# Patient Record
Sex: FEMALE | Race: WHITE
[De-identification: names, ages, dates, MRNs, and addresses within clinical notes are randomized per-mention and may not be internally consistent; named-entity substitution may affect disease eponyms.]

---

## 2018-01-12 ENCOUNTER — HOSPITAL ENCOUNTER (EMERGENCY)
Dept: HOSPITAL 62 - ER | Age: 23
LOS: 1 days | Discharge: HOME | End: 2018-01-13
Payer: COMMERCIAL

## 2018-01-12 VITALS — DIASTOLIC BLOOD PRESSURE: 77 MMHG | SYSTOLIC BLOOD PRESSURE: 121 MMHG

## 2018-01-12 DIAGNOSIS — R10.30: ICD-10-CM

## 2018-01-12 DIAGNOSIS — N20.0: Primary | ICD-10-CM

## 2018-01-12 DIAGNOSIS — R11.2: ICD-10-CM

## 2018-01-12 PROCEDURE — 76380 CAT SCAN FOLLOW-UP STUDY: CPT

## 2018-01-12 PROCEDURE — 36415 COLL VENOUS BLD VENIPUNCTURE: CPT

## 2018-01-12 PROCEDURE — 96375 TX/PRO/DX INJ NEW DRUG ADDON: CPT

## 2018-01-12 PROCEDURE — 85025 COMPLETE CBC W/AUTO DIFF WBC: CPT

## 2018-01-12 PROCEDURE — 83690 ASSAY OF LIPASE: CPT

## 2018-01-12 PROCEDURE — 81025 URINE PREGNANCY TEST: CPT

## 2018-01-12 PROCEDURE — 96374 THER/PROPH/DIAG INJ IV PUSH: CPT

## 2018-01-12 PROCEDURE — 99284 EMERGENCY DEPT VISIT MOD MDM: CPT

## 2018-01-12 PROCEDURE — 81001 URINALYSIS AUTO W/SCOPE: CPT

## 2018-01-12 PROCEDURE — 80053 COMPREHEN METABOLIC PANEL: CPT

## 2018-01-12 PROCEDURE — 87086 URINE CULTURE/COLONY COUNT: CPT

## 2018-01-13 LAB
ADD MANUAL DIFF: NO
ALBUMIN SERPL-MCNC: 4.6 G/DL (ref 3.5–5)
ALP SERPL-CCNC: 87 U/L (ref 38–126)
ALT SERPL-CCNC: 19 U/L (ref 9–52)
ANION GAP SERPL CALC-SCNC: 15 MMOL/L (ref 5–19)
APPEARANCE UR: (no result)
APTT PPP: YELLOW S
AST SERPL-CCNC: 16 U/L (ref 14–36)
BASOPHILS # BLD AUTO: 0.1 10^3/UL (ref 0–0.2)
BASOPHILS NFR BLD AUTO: 1 % (ref 0–2)
BILIRUB DIRECT SERPL-MCNC: 0.2 MG/DL (ref 0–0.4)
BILIRUB SERPL-MCNC: 0.3 MG/DL (ref 0.2–1.3)
BILIRUB UR QL STRIP: NEGATIVE
BUN SERPL-MCNC: 13 MG/DL (ref 7–20)
CALCIUM: 9.9 MG/DL (ref 8.4–10.2)
CHLORIDE SERPL-SCNC: 107 MMOL/L (ref 98–107)
CO2 SERPL-SCNC: 18 MMOL/L (ref 22–30)
EOSINOPHIL # BLD AUTO: 0.2 10^3/UL (ref 0–0.6)
EOSINOPHIL NFR BLD AUTO: 1.6 % (ref 0–6)
ERYTHROCYTE [DISTWIDTH] IN BLOOD BY AUTOMATED COUNT: 13.6 % (ref 11.5–14)
GLUCOSE SERPL-MCNC: 138 MG/DL (ref 75–110)
GLUCOSE UR STRIP-MCNC: NEGATIVE MG/DL
HCT VFR BLD CALC: 38.6 % (ref 36–47)
HGB BLD-MCNC: 13 G/DL (ref 12–15.5)
KETONES UR STRIP-MCNC: 20 MG/DL
LIPASE SERPL-CCNC: 69 U/L (ref 23–300)
LYMPHOCYTES # BLD AUTO: 3.7 10^3/UL (ref 0.5–4.7)
LYMPHOCYTES NFR BLD AUTO: 31.9 % (ref 13–45)
MCH RBC QN AUTO: 28.5 PG (ref 27–33.4)
MCHC RBC AUTO-ENTMCNC: 33.5 G/DL (ref 32–36)
MCV RBC AUTO: 85 FL (ref 80–97)
MONOCYTES # BLD AUTO: 0.5 10^3/UL (ref 0.1–1.4)
MONOCYTES NFR BLD AUTO: 4.2 % (ref 3–13)
NEUTROPHILS # BLD AUTO: 7.1 10^3/UL (ref 1.7–8.2)
NEUTS SEG NFR BLD AUTO: 61.3 % (ref 42–78)
NITRITE UR QL STRIP: NEGATIVE
PH UR STRIP: 5 [PH] (ref 5–9)
PLATELET # BLD: 274 10^3/UL (ref 150–450)
POTASSIUM SERPL-SCNC: 3.6 MMOL/L (ref 3.6–5)
PROT SERPL-MCNC: 7.1 G/DL (ref 6.3–8.2)
PROT UR STRIP-MCNC: NEGATIVE MG/DL
RBC # BLD AUTO: 4.54 10^6/UL (ref 3.72–5.28)
SODIUM SERPL-SCNC: 140 MMOL/L (ref 137–145)
SP GR UR STRIP: 1.03
TOTAL CELLS COUNTED % (AUTO): 100 %
UROBILINOGEN UR-MCNC: NEGATIVE MG/DL (ref ?–2)
WBC # BLD AUTO: 11.6 10^3/UL (ref 4–10.5)

## 2018-01-13 NOTE — RADIOLOGY REPORT (SQ)
EXAM DESCRIPTION:  CT ABDOMEN AND PELVIS WITHOUT CONTRAST



CLINICAL HISTORY: left flank pain



COMPARISON: None Available.



TECHNIQUE: CT of the abdomen and pelvis without IV contrast.



FINDINGS: 



Abdomen:

The liver has normal size and density.  No calcified gallstones. 

The spleen, pancreas, and adrenal glands are unremarkable.  The

kidneys have normal size and contour without evidence of

hydronephrosis. No obstructing ureteral calculi.  The aorta and

IVC have normal caliber and position.  No free intraperitoneal

air.  The stomach and duodenum have normal course.



Pelvis: Uterus is not enlarged.  Urinary bladder is unremarkable.

 No free pelvic fluid or lymphadenopathy.  No dilated loops of

large or small bowel.  The appendix is not definitely identified

however no right lower quadrant inflammatory change. Punctate

calcified phleboliths in the pelvis. Hyperdense material in the

distal colon may be related to previously administered oral

contrast.



The visualized  lung bases are clear.



No destructive bone lesions identified.



DLP: 238.14 mGy-cm





IMPRESSION: 

1. No acute inflammatory or obstructive abnormality identified



This exam was performed according to our departmental

dose-optimization program, which includes automated exposure

control, adjustment of the mA and/or kV according to patient size

and/or use of iterative reconstruction technique.

## 2018-01-13 NOTE — ER DOCUMENT REPORT
ED General





- General


Chief Complaint: Flank Pain


Stated Complaint: FLANK PAIN


Time Seen by Provider: 01/13/18 00:39


Mode of Arrival: Ambulatory


Information source: Patient


Notes: 





22-year-old female no previous medical history presents with complaints of left 

flank pain rating to her groin.  Patient notes the pain started approximately 

at 10:00 tonight.  She denies any fevers or chills admits to nausea and 

vomiting.  Patient denies any previous history of kidney stones, notes her 

father has extensive history of kidney stones


TRAVEL OUTSIDE OF THE [place] IN LAST 30 DAYS: No





- HPI


Onset: Just prior to arrival


Onset/Duration: Sudden


Quality of pain: Sharp


Severity: Moderate


Pain Level: 2


Associated symptoms: Nausea, Vomiting


Exacerbated by: Denies


Relieved by: Denies


Similar symptoms previously: No


Recently seen / treated by doctor: No





- Related Data


Allergies/Adverse Reactions: 


 





No Known Allergies Allergy (Verified 01/12/18 23:19)


 











Past Medical History





- Social History


Smoking Status: Never Smoker


Cigarette use (# per day): No


Chew tobacco use (# tins/day): No


Smoking Education Provided: No


Family History: Other - kidney stone


Past Surgical History: Reports: Hx Oral Surgery, Hx Orthopedic Surgery - left 

knee





Review of Systems





- Review of Systems


Notes: 





REVIEW OF SYSTEMS:


CONSTITUTIONAL :  Denies fever,  chills, or sweats.  Denies recent illness.


EENT:   Denies eye, ear, throat, or mouth pain or symptoms.  Denies nasal or 

sinus congestion or discharge.  Denies throat, tongue, or mouth swelling or 

difficulty swallowing.


CARDIOVASCULAR:  Denies chest pain.  Denies palpitations or racing or irregular 

heart beat.  Denies ankle edema.


RESPIRATORY:  Denies cough, cold, or chest congestion.  Denies shortness of 

breath, difficulty breathing, or wheezing.


GASTROINTESTINAL: Admits to left flank pain rating to the groin with nausea 

vomiting


GENITOURINARY:  Denies difficulty urinating, painful urination, burning, 

frequency, blood in urine, or discharge.


FEMALE  GENITOURINARY:  Denies vaginal bleeding, heavy or abnormal periods, 

irregular periods.  Denies vaginal discharge or odor. 


MUSCULOSKELETAL:  Denies back or neck pain or stiffness.  Denies joint pain or 

swelling.


SKIN:   Denies rash, lesions or sores.


HEMATOLOGIC :   Denies easy bruising or bleeding.


LYMPHATIC:  Denies swollen, enlarged glands.


NEUROLOGICAL:  Denies confusion or altered mental status.  Denies passing out 

or loss of consciousness.  Denies dizziness or lightheadedness.  Denies 

headache.  Denies weakness or paralysis or loss of use of either side.  Denies 

problems with gait or speech.  Denies sensory loss, numbness, or tingling.  

Denies seizures.


PSYCHIATRIC:  Denies anxiety or stress.  Denies depression, suicidal ideation, 

or homicidal ideation.





ALL OTHER SYSTEMS REVIEWED AND NEGATIVE.











PHYSICAL EXAMINATION:





GENERAL: Well-appearing, well-nourished and in no acute distress.





HEAD: Atraumatic, normocephalic.





EYES: Pupils equal round and reactive to light, extraocular movements intact, 

conjunctiva are normal.





ENT: Nares patent, oropharynx clear without exudates.  Moist mucous membranes.





NECK: Normal range of motion, supple without lymphadenopathy





LUNGS: Breath sounds clear to auscultation bilaterally and equal.  No wheezes 

rales or rhonchi.





HEART: Regular rate and rhythm without murmurs





ABDOMEN: Soft, nontender, nondistended abdomen.  No guarding, no rebound.  No 

masses appreciated.  Left CVA tenderness





Female : deferred





Musculoskeletal: Normal range of motion, no pitting or edema.  No cyanosis.





NEUROLOGICAL: Cranial nerves grossly intact.  Normal speech, normal gait.  

Normal sensory, motor exams





PSYCH: Normal mood, normal affect.





SKIN: Warm, Dry, normal turgor, no rashes or lesions noted.

















Dictation was performed using Dragon voice recognition software





Physical Exam





- Vital signs


Vitals: 


 











Temp Pulse Resp BP Pulse Ox


 


 97.5 F   88   20   121/77   100 


 


 01/12/18 23:24  01/12/18 23:24  01/12/18 23:24  01/12/18 23:24  01/12/18 23:24














Course





- Re-evaluation


Re-evalutation: 





01/13/18 00:44


Patient's presentation is consistent with kidney stone, CT is pending 

urinalysis noted no significant sinus infection


01/13/18 02:55


CT noted no significant abnormality, given the urinalysis I do believe the 

patient has calcium oxalate stone, she was given a strainer and just prior to 

discharge she did urinate and catch a tiny crystal








Patient has been given urology follow-up








After performing a Medical Screening Examination, I estimate there is LOW risk 

for ACUTE APPENDICITIS, BOWEL OBSTRUCTION, ACUTE CHOLECYSTITIS, PERFORATED 

DIVERTICULITIS, INCARCERATED HERNIA, PANCREATITIS, PELVIC INFLAMMATORY DISEASE, 

PERFORATED ULCER, ECTOPIC PREGNANCY, or TUBO-OVARIAN ABSCESS, thus I consider 

the discharge disposition reasonable. Also, there is no evidence or peritonitis

, sepsis, or toxicity. I have reevaluated this patient multiple times and no 

significant life threatening changes are noted. The patient and I have 

discussed the diagnosis and risks, and we agree with discharging home with 

close follow-up with the understanding that symptoms and presentations can 

change. We also discussed returning to the Emergency Department immediately if 

new or worsening symptoms occur. We have discussed the symptoms which are most 

concerning (e.g., bloody stool, fever, changing or worsening pain, vomiting) 

that necessitate immediate return.





- Vital Signs


Vital signs: 


 











Temp Pulse Resp BP Pulse Ox


 


 97.5 F   88   20   121/77   100 


 


 01/12/18 23:24  01/12/18 23:24  01/12/18 23:24  01/12/18 23:24  01/12/18 23:24














- Laboratory


Result Diagrams: 


 01/12/18 23:35





 01/12/18 23:35


Laboratory results interpreted by me: 


 











  01/12/18 01/12/18 01/12/18





  23:35 23:35 23:35


 


WBC  11.6 H  


 


Carbon Dioxide   18 L 


 


Glucose   138 H 


 


Urine Ketones    20 H














- Diagnostic Test


Radiology reviewed: Image reviewed, Reports reviewed





Discharge





- Discharge


Clinical Impression: 


 Kidney stone





Condition: Stable


Disposition: HOME, SELF-CARE


Instructions:  Kidney Stone ([facility])


Prescriptions: 


Ketorolac Tromethamine [Toradol 10 mg Tablet] 10 mg PO Q8 #30 tablet


Metoclopramide HCl [Reglan] 10 mg PO Q6 #20 tablet


Referrals: 


EMILY CAMPOS MD [ACTIVE STAFF] - Follow up tomorrow

## 2018-02-16 ENCOUNTER — HOSPITAL ENCOUNTER (EMERGENCY)
Dept: HOSPITAL 62 - ER | Age: 23
Discharge: HOME | End: 2018-02-16
Payer: COMMERCIAL

## 2018-02-16 VITALS — SYSTOLIC BLOOD PRESSURE: 125 MMHG | DIASTOLIC BLOOD PRESSURE: 60 MMHG

## 2018-02-16 DIAGNOSIS — R03.0: ICD-10-CM

## 2018-02-16 DIAGNOSIS — R07.9: Primary | ICD-10-CM

## 2018-02-16 DIAGNOSIS — R00.2: ICD-10-CM

## 2018-02-16 DIAGNOSIS — R42: ICD-10-CM

## 2018-02-16 DIAGNOSIS — R20.0: ICD-10-CM

## 2018-02-16 DIAGNOSIS — R11.0: ICD-10-CM

## 2018-02-16 LAB
ADD MANUAL DIFF: NO
ALBUMIN SERPL-MCNC: 4.8 G/DL (ref 3.5–5)
ALP SERPL-CCNC: 76 U/L (ref 38–126)
ALT SERPL-CCNC: 21 U/L (ref 9–52)
ANION GAP SERPL CALC-SCNC: 13 MMOL/L (ref 5–19)
APPEARANCE UR: CLEAR
APTT PPP: (no result) S
AST SERPL-CCNC: 20 U/L (ref 14–36)
BASOPHILS # BLD AUTO: 0.1 10^3/UL (ref 0–0.2)
BASOPHILS NFR BLD AUTO: 0.8 % (ref 0–2)
BILIRUB DIRECT SERPL-MCNC: 0.1 MG/DL (ref 0–0.4)
BILIRUB SERPL-MCNC: 0.3 MG/DL (ref 0.2–1.3)
BILIRUB UR QL STRIP: NEGATIVE
BUN SERPL-MCNC: 10 MG/DL (ref 7–20)
CALCIUM: 10.4 MG/DL (ref 8.4–10.2)
CHLORIDE SERPL-SCNC: 105 MMOL/L (ref 98–107)
CK MB SERPL-MCNC: 0.38 NG/ML (ref ?–4.55)
CK SERPL-CCNC: 50 U/L (ref 30–135)
CO2 SERPL-SCNC: 23 MMOL/L (ref 22–30)
EOSINOPHIL # BLD AUTO: 0.1 10^3/UL (ref 0–0.6)
EOSINOPHIL NFR BLD AUTO: 0.6 % (ref 0–6)
ERYTHROCYTE [DISTWIDTH] IN BLOOD BY AUTOMATED COUNT: 13.4 % (ref 11.5–14)
GLUCOSE SERPL-MCNC: 79 MG/DL (ref 75–110)
GLUCOSE UR STRIP-MCNC: NEGATIVE MG/DL
HCT VFR BLD CALC: 40 % (ref 36–47)
HGB BLD-MCNC: 13.4 G/DL (ref 12–15.5)
KETONES UR STRIP-MCNC: NEGATIVE MG/DL
LYMPHOCYTES # BLD AUTO: 2 10^3/UL (ref 0.5–4.7)
LYMPHOCYTES NFR BLD AUTO: 17.5 % (ref 13–45)
MCH RBC QN AUTO: 28.9 PG (ref 27–33.4)
MCHC RBC AUTO-ENTMCNC: 33.5 G/DL (ref 32–36)
MCV RBC AUTO: 86 FL (ref 80–97)
MONOCYTES # BLD AUTO: 0.5 10^3/UL (ref 0.1–1.4)
MONOCYTES NFR BLD AUTO: 4.2 % (ref 3–13)
NEUTROPHILS # BLD AUTO: 8.9 10^3/UL (ref 1.7–8.2)
NEUTS SEG NFR BLD AUTO: 76.9 % (ref 42–78)
NITRITE UR QL STRIP: NEGATIVE
PH UR STRIP: 6 [PH] (ref 5–9)
PLATELET # BLD: 272 10^3/UL (ref 150–450)
POTASSIUM SERPL-SCNC: 4.7 MMOL/L (ref 3.6–5)
PROT SERPL-MCNC: 7.4 G/DL (ref 6.3–8.2)
PROT UR STRIP-MCNC: NEGATIVE MG/DL
RBC # BLD AUTO: 4.63 10^6/UL (ref 3.72–5.28)
SODIUM SERPL-SCNC: 141.3 MMOL/L (ref 137–145)
SP GR UR STRIP: 1.01
TOTAL CELLS COUNTED % (AUTO): 100 %
TROPONIN I SERPL-MCNC: < 0.012 NG/ML
UROBILINOGEN UR-MCNC: NEGATIVE MG/DL (ref ?–2)
WBC # BLD AUTO: 11.5 10^3/UL (ref 4–10.5)

## 2018-02-16 PROCEDURE — 36415 COLL VENOUS BLD VENIPUNCTURE: CPT

## 2018-02-16 PROCEDURE — 93005 ELECTROCARDIOGRAM TRACING: CPT

## 2018-02-16 PROCEDURE — 84703 CHORIONIC GONADOTROPIN ASSAY: CPT

## 2018-02-16 PROCEDURE — 71046 X-RAY EXAM CHEST 2 VIEWS: CPT

## 2018-02-16 PROCEDURE — 82550 ASSAY OF CK (CPK): CPT

## 2018-02-16 PROCEDURE — 96360 HYDRATION IV INFUSION INIT: CPT

## 2018-02-16 PROCEDURE — 84484 ASSAY OF TROPONIN QUANT: CPT

## 2018-02-16 PROCEDURE — 85379 FIBRIN DEGRADATION QUANT: CPT

## 2018-02-16 PROCEDURE — 93010 ELECTROCARDIOGRAM REPORT: CPT

## 2018-02-16 PROCEDURE — 99285 EMERGENCY DEPT VISIT HI MDM: CPT

## 2018-02-16 PROCEDURE — 82553 CREATINE MB FRACTION: CPT

## 2018-02-16 PROCEDURE — 84443 ASSAY THYROID STIM HORMONE: CPT

## 2018-02-16 PROCEDURE — 80053 COMPREHEN METABOLIC PANEL: CPT

## 2018-02-16 PROCEDURE — 81001 URINALYSIS AUTO W/SCOPE: CPT

## 2018-02-16 PROCEDURE — 85025 COMPLETE CBC W/AUTO DIFF WBC: CPT

## 2018-02-16 NOTE — ER DOCUMENT REPORT
ED General





- General


Chief Complaint: High Blood Pressure


Stated Complaint: BLOOD PRESSURE PROBLEMS


Time Seen by Provider: 02/16/18 09:58


Mode of Arrival: Ambulatory


Information source: Patient, Parent


Notes: 





Patient states that she felt faint this morning and then started to have 

palpitations.  Patient states at the time she had chest pain, nausea and 

dizziness.  Patient states symptoms lasted for about an hour.  Patient states 

that her left arm has felt weird and she has had tingling to the left fourth 

and fifth fingers to the level about her elbow.  Patient does state she had a 

similar episode about a week ago but did not get seen after that incident.  

States she did eat breakfast today.  Patient presently denies any complaints at 

this time.


TRAVEL OUTSIDE OF THE [place] IN LAST 30 DAYS: No





- HPI


Onset: This morning


Onset/Duration: Better


Quality of pain: Pressure


Pain Level: Denies


Associated symptoms: Chest pain, Nausea.  denies: Nonproductive cough, 

Productive cough, Fever, Vomiting, Shortness of breath


Exacerbated by: Denies


Relieved by: Denies


Similar symptoms previously: Yes


Recently seen / treated by doctor: No





- Related Data


Allergies/Adverse Reactions: 


 





No Known Allergies Allergy (Verified 02/16/18 09:08)


 











Past Medical History





- General


Information source: Patient, Parent





- Social History


Smoking Status: Never Smoker


Chew tobacco use (# tins/day): No


Frequency of alcohol use: None


Drug Abuse: None


Occupation: Student


Lives with: Family


Family History: None


Patient has suicidal ideation: No


Patient has homicidal ideation: No





- Medical History


Medical History: Negative


Renal/ Medical History: Denies: Hx Peritoneal Dialysis


Past Surgical History: Reports: Hx Oral Surgery, Hx Orthopedic Surgery - left 

knee, Hx Tonsillectomy





Review of Systems





- Review of Systems


Constitutional: No symptoms reported.  denies: Fever, Recent illness


EENT: No symptoms reported


Cardiovascular: Chest pain, Palpitations, Dizziness


Respiratory: No symptoms reported.  denies: Cough


Gastrointestinal: Nausea.  denies: Vomiting


Genitourinary: No symptoms reported


Female Genitourinary: No symptoms reported


Musculoskeletal: No symptoms reported


Skin: No symptoms reported


Hematologic/Lymphatic: No symptoms reported


Neurological/Psychological: Tingling - Left upper extremity.  denies: Confusion





Physical Exam





- Vital signs


Vitals: 


 











Temp Pulse Resp BP Pulse Ox


 


 98.5 F   105 H  16   126/65 H  100 


 


 02/16/18 09:12  02/16/18 09:12  02/16/18 09:12  02/16/18 09:12  02/16/18 09:12














- General


General appearance: Appears well, Alert


In distress: None





- HEENT


Head: Normocephalic


Eyes: Normal


Conjunctiva: Normal


Ears: Normal


Nasal: Normal


Mouth/Lips: Normal


Mucous membranes: Normal


Neck: Normal, Supple.  No: Lymphadenopathy, Meningismus





- Respiratory


Respiratory status: No respiratory distress


Chest status: Nontender


Breath sounds: Normal.  No: Rales, Rhonchi, Stridor, Wheezing


Chest palpation: Normal





- Cardiovascular


Rhythm: Tachycardia


Heart sounds: S1 appreciated, S2 appreciated


Murmur: No





- Abdominal


Inspection: Normal


Distension: No distension


Bowel sounds: Normal


Tenderness: Nontender


Organomegaly: No organomegaly





- Back


Back: Normal, Nontender.  No: CVA tenderness





- Extremities


General upper extremity: Normal inspection, Normal ROM


General lower extremity: Normal inspection, Normal ROM.  No: Edema





- Neurological


Neuro grossly intact: Yes


Cognition: Normal


Orientation: AAOx4


Alicia Coma Scale Eye Opening: Spontaneous


Alicia Coma Scale Verbal: Oriented


Alicia Coma Scale Motor: Obeys Commands


Alicia Coma Scale Total: 15


Speech: Normal.  No: Dysarthria


Cranial nerves: Normal


Motor strength normal: LUE, RUE, LLE, RLE





- Psychological


Associated symptoms: Normal affect, Normal mood





- Skin


Skin Temperature: Warm


Skin Moisture: Dry


Skin Color: Pale





Course





- Re-evaluation


Re-evalutation: 





02/16/18 12:58


Consulted with Dr. Mobley regarding patient presentation, reviewed patient's EKG 

and diagnostic test results.  No additional testing advised at this time.





The patient has atypical chest pain as the patient's chest pain is not 

suggestive of pulmonary embolus, cardiac ischemia, aortic dissection, or other 

serious etiology.  Given the extremely low risk of these diagnoses for the test 

in evaluation for these possibilities does not appear to be indicated at this 

time.  Patient has been instructed to return if the symptoms worsen or change 

in any way.Heart score 0.





- Vital Signs


Vital signs: 


 











Temp Pulse Resp BP Pulse Ox


 


 98.1 F   90   16   125/60   100 


 


 02/16/18 13:10  02/16/18 13:10  02/16/18 13:10  02/16/18 13:10  02/16/18 13:10














- Laboratory


Result Diagrams: 


 02/16/18 11:10





 02/16/18 11:10


Laboratory results interpreted by me: 


 











  02/16/18 02/16/18





  11:10 11:10


 


WBC  11.5 H 


 


Absolute Neutrophils  8.9 H 


 


Calcium   10.4 H














 Labs- Entire Visit











  02/16/18 02/16/18 02/16/18





  09:51 11:10 11:10


 


WBC   11.5 H 


 


RBC   4.63 


 


Hgb   13.4 


 


Hct   40.0 


 


MCV   86 


 


MCH   28.9 


 


MCHC   33.5 


 


RDW   13.4 


 


Plt Count   272 


 


Seg Neutrophils %   76.9 


 


Lymphocytes %   17.5 


 


Monocytes %   4.2 


 


Eosinophils %   0.6 


 


Basophils %   0.8 


 


Absolute Neutrophils   8.9 H 


 


Absolute Lymphocytes   2.0 


 


Absolute Monocytes   0.5 


 


Absolute Eosinophils   0.1 


 


Absolute Basophils   0.1 


 


D-Dimer    < 0.27


 


Sodium   


 


Potassium   


 


Chloride   


 


Carbon Dioxide   


 


Anion Gap   


 


BUN   


 


Creatinine   


 


Est GFR ( Amer)   


 


Est GFR (Non-Af Amer)   


 


Glucose   


 


Calcium   


 


Total Bilirubin   


 


Direct Bilirubin   


 


Neonat Total Bilirubin   


 


Neonat Direct Bilirubin   


 


Neonat Indirect Bili   


 


AST   


 


ALT   


 


Alkaline Phosphatase   


 


Creatine Kinase   


 


CK-MB (CK-2)   


 


Troponin I   


 


Total Protein   


 


Albumin   


 


TSH   


 


Serum HCG, Qual   


 


Urine Color  STRAW  


 


Urine Appearance  CLEAR  


 


Urine pH  6.0  


 


Ur Specific Gravity  1.006  


 


Urine Protein  NEGATIVE  


 


Urine Glucose (UA)  NEGATIVE  


 


Urine Ketones  NEGATIVE  


 


Urine Blood  NEGATIVE  


 


Urine Nitrite  NEGATIVE  


 


Urine Bilirubin  NEGATIVE  


 


Urine Urobilinogen  NEGATIVE  


 


Ur Leukocyte Esterase  NEGATIVE  


 


Urine WBC (Auto)  0  


 


Urine RBC (Auto)  0  


 


Urine Mucus (Auto)  RARE  


 


Urine Ascorbic Acid  NEGATIVE  














  02/16/18 02/16/18 02/16/18





  11:10 11:10 11:10


 


WBC   


 


RBC   


 


Hgb   


 


Hct   


 


MCV   


 


MCH   


 


MCHC   


 


RDW   


 


Plt Count   


 


Seg Neutrophils %   


 


Lymphocytes %   


 


Monocytes %   


 


Eosinophils %   


 


Basophils %   


 


Absolute Neutrophils   


 


Absolute Lymphocytes   


 


Absolute Monocytes   


 


Absolute Eosinophils   


 


Absolute Basophils   


 


D-Dimer   


 


Sodium  141.3  


 


Potassium  4.7  


 


Chloride  105  


 


Carbon Dioxide  23  


 


Anion Gap  13  


 


BUN  10  


 


Creatinine  0.63  


 


Est GFR ( Amer)  > 60  


 


Est GFR (Non-Af Amer)  > 60  


 


Glucose  79  


 


Calcium  10.4 H  


 


Total Bilirubin  0.3  


 


Direct Bilirubin  0.1  


 


Neonat Total Bilirubin  Not Reportable  


 


Neonat Direct Bilirubin  Not Reportable  


 


Neonat Indirect Bili  Not Reportable  


 


AST  20  


 


ALT  21  


 


Alkaline Phosphatase  76  


 


Creatine Kinase  50  


 


CK-MB (CK-2)    0.38


 


Troponin I    < 0.012


 


Total Protein  7.4  


 


Albumin  4.8  


 


TSH   


 


Serum HCG, Qual   NEGATIVE 


 


Urine Color   


 


Urine Appearance   


 


Urine pH   


 


Ur Specific Gravity   


 


Urine Protein   


 


Urine Glucose (UA)   


 


Urine Ketones   


 


Urine Blood   


 


Urine Nitrite   


 


Urine Bilirubin   


 


Urine Urobilinogen   


 


Ur Leukocyte Esterase   


 


Urine WBC (Auto)   


 


Urine RBC (Auto)   


 


Urine Mucus (Auto)   


 


Urine Ascorbic Acid   














  02/16/18





  11:10


 


WBC 


 


RBC 


 


Hgb 


 


Hct 


 


MCV 


 


MCH 


 


MCHC 


 


RDW 


 


Plt Count 


 


Seg Neutrophils % 


 


Lymphocytes % 


 


Monocytes % 


 


Eosinophils % 


 


Basophils % 


 


Absolute Neutrophils 


 


Absolute Lymphocytes 


 


Absolute Monocytes 


 


Absolute Eosinophils 


 


Absolute Basophils 


 


D-Dimer 


 


Sodium 


 


Potassium 


 


Chloride 


 


Carbon Dioxide 


 


Anion Gap 


 


BUN 


 


Creatinine 


 


Est GFR ( Amer) 


 


Est GFR (Non-Af Amer) 


 


Glucose 


 


Calcium 


 


Total Bilirubin 


 


Direct Bilirubin 


 


Neonat Total Bilirubin 


 


Neonat Direct Bilirubin 


 


Neonat Indirect Bili 


 


AST 


 


ALT 


 


Alkaline Phosphatase 


 


Creatine Kinase 


 


CK-MB (CK-2) 


 


Troponin I 


 


Total Protein 


 


Albumin 


 


TSH  1.21


 


Serum HCG, Qual 


 


Urine Color 


 


Urine Appearance 


 


Urine pH 


 


Ur Specific Gravity 


 


Urine Protein 


 


Urine Glucose (UA) 


 


Urine Ketones 


 


Urine Blood 


 


Urine Nitrite 


 


Urine Bilirubin 


 


Urine Urobilinogen 


 


Ur Leukocyte Esterase 


 


Urine WBC (Auto) 


 


Urine RBC (Auto) 


 


Urine Mucus (Auto) 


 


Urine Ascorbic Acid 














- Diagnostic Test


Radiology reviewed: Reports reviewed





- EKG Interpretation by Me


EKG shows normal: Sinus rhythm


Rate: Normal





Discharge





- Discharge


Clinical Impression: 


 Palpitations





Chest pain


Qualifiers:


 Chest pain type: unspecified Qualified Code(s): R07.9 - Chest pain, unspecified





Condition: Stable


Disposition: HOME, SELF-CARE


Instructions:  Chest Pain of Unclear Cause ([facility]), Palpitations (Irregular or 

Rapid Heartrate) ([facility])


Additional Instructions: 


Return immediately for any new or worsening symptoms





Followup with your primary care provider, call tomorrow to make a followup 

appointment





Follow-up with your cardiologist for recheck


Forms:  Return to School


Referrals: 


MARGARETTE CALDERON MD [ACTIVE STAFF] - Follow up in 3-5 days

## 2018-02-16 NOTE — RADIOLOGY REPORT (SQ)
EXAM DESCRIPTION:  CHEST PA/LAT



COMPLETED DATE/TIME:  2/16/2018 10:42 am



REASON FOR STUDY:  cp



COMPARISON:  None.



EXAM PARAMETERS:  NUMBER OF VIEWS: two views

TECHNIQUE: Digital Frontal and Lateral radiographic views of the chest acquired.

RADIATION DOSE: NA

LIMITATIONS: none



FINDINGS:  LUNGS AND PLEURA: No opacities, masses or pneumothorax. No pleural effusion.

MEDIASTINUM AND HILAR STRUCTURES: No masses or contour abnormalities.

HEART AND VASCULAR STRUCTURES: Heart normal size.  No evidence for failure.

BONES: No acute findings.

HARDWARE: None in the chest.

OTHER: No other significant finding.



IMPRESSION:  NO SIGNIFICANT RADIOGRAPHIC FINDING IN THE CHEST.



TECHNICAL DOCUMENTATION:  JOB ID:  [id number]

 2011 [employer]- All Rights Reserved

## 2018-12-14 ENCOUNTER — HOSPITAL ENCOUNTER (OUTPATIENT)
Dept: HOSPITAL 62 - OD | Age: 23
End: 2018-12-14
Attending: MIDWIFE
Payer: COMMERCIAL

## 2018-12-14 ENCOUNTER — HOSPITAL ENCOUNTER (EMERGENCY)
Dept: HOSPITAL 62 - ER | Age: 23
LOS: 1 days | Discharge: HOME | End: 2018-12-15
Payer: COMMERCIAL

## 2018-12-14 DIAGNOSIS — O26.859: Primary | ICD-10-CM

## 2018-12-14 DIAGNOSIS — O36.0910: Primary | ICD-10-CM

## 2018-12-14 DIAGNOSIS — Z3A.01: ICD-10-CM

## 2018-12-14 PROCEDURE — 86901 BLOOD TYPING SEROLOGIC RH(D): CPT

## 2018-12-14 PROCEDURE — 36415 COLL VENOUS BLD VENIPUNCTURE: CPT

## 2018-12-14 PROCEDURE — 86900 BLOOD TYPING SEROLOGIC ABO: CPT

## 2018-12-14 PROCEDURE — 86850 RBC ANTIBODY SCREEN: CPT

## 2018-12-14 PROCEDURE — 96372 THER/PROPH/DIAG INJ SC/IM: CPT

## 2018-12-14 PROCEDURE — 99283 EMERGENCY DEPT VISIT LOW MDM: CPT

## 2018-12-14 NOTE — ER DOCUMENT REPORT
ED General





- General


Chief Complaint: Other


Stated Complaint: NEEDS SHOT


Time Seen by Provider: 18 21:07


Notes: 





Patient is a 23-year old female  at approximately 7 weeks gestation who 

presents due to concerns of needing RhoGam.  The patient was seen as an 

outpatient earlier today, diagnosed with a intrauterine pregnancy with an 

active heart rate but was informed that she was Rh- and would need to come to 

the emergency department for RhoGam administration.  She states that she has 

had some intermittent cramping since the onset of bleeding over the last 

several days and mild vaginal spotting.  That pain is mild, intermittent and 

not improved or worsened by any factor.  Denies any active bleeding currently.  

Denies any new concerns stating that her only reason for coming to the 

emergency department today is for RhoGam administration.


TRAVEL OUTSIDE OF THE [place] IN LAST 30 DAYS: No





- Related Data


Allergies/Adverse Reactions: 


 





No Known Allergies Allergy (Verified 18 09:08)


 











Past Medical History





- General


Information source: Patient





- Social History


Smoking Status: Never Smoker


Chew tobacco use (# tins/day): No


Frequency of alcohol use: None


Drug Abuse: None


Lives with: Parents


Family History: Reviewed & Not Pertinent


Patient has suicidal ideation: No


Patient has homicidal ideation: No


Renal/ Medical History: Denies: Hx Peritoneal Dialysis


Past Surgical History: Reports: Hx Oral Surgery, Hx Orthopedic Surgery - left 

knee, Hx Tonsillectomy





Review of Systems





- Review of Systems


Notes: 





Constitutional: Negative for fever.


HENT: Negative for sore throat.


Eyes: Negative for visual changes.


Cardiovascular: Negative for chest pain.


Respiratory: Negative for shortness of breath.


Gastrointestinal: Positive for abdominal cramping


Genitourinary: Positive for vaginal bleeding


Musculoskeletal: Negative for back pain.


Skin: Negative for rash.


Neurological: Negative for headaches, weakness or numbness.





10 point ROS negative except as marked above and in HPI.





Physical Exam





- Vital signs


Vitals: 


 











Temp Pulse Resp BP Pulse Ox


 


 98.6 F   102 H  15   122/69   100 


 


 18 20:40  18 20:40  18 20:40  18 20:40  18 20:40











Interpretation: Tachycardic


Notes: 





PHYSICAL EXAMINATION:





GENERAL: Well-appearing, well-nourished and in no acute distress.





HEAD: Atraumatic, normocephalic.





EYES: Pupils equal round and reactive to light, extraocular movements intact, 

sclera anicteric, conjunctiva are normal.





ENT: nares patent, oropharynx clear without exudates.  Moist mucous membranes.





NECK: Normal range of motion, supple without lymphadenopathy





LUNGS: Breath sounds clear to auscultation bilaterally and equal.  No wheezes 

rales or rhonchi.





HEART: Regular rate and rhythm without murmurs





ABDOMEN: Soft, nontender, normoactive bowel sounds.  No guarding, no rebound.  

No masses appreciated.





EXTREMITIES: Normal range of motion, no pitting or edema.  No cyanosis.





NEUROLOGICAL: No focal neurological deficits. Moves all extremities 

spontaneously and on command.





PSYCH: Normal mood, normal affect.





SKIN: Warm, Dry, normal turgor, no rashes or lesions noted.





Course





- Re-evaluation


Re-evalutation: 





18 21:37


Patient presents with a need for rhogam. She has had light vaginal spotting 

over the past 48 hours, already had an outpatient workup done today that showed 

a viable iup but that she is rh negative. She was referred to the emergency 

department exclusively for administration of RhoGam.   she denies any other 

current complaints.  She has received a appropriate dose of RhoGam here in the 

emergency department and will be discharged home.  At this time will discharge 

with return precautions and follow-up recommendations.  Verbal discharge 

instructions given a the bedside and opportunity for questions given. 

Medication warnings reviewed. Patient is in agreement with this plan and has 

verbalized understanding of return precautions and the need for primary care 

follow-up in the next 24-72 hours.





- Vital Signs


Vital signs: 


 











Temp Pulse Resp BP Pulse Ox


 


 98.5 F   92   18   102/68   99 


 


 12/15/18 00:28  12/15/18 00:28  12/15/18 00:28  12/15/18 00:28  12/15/18 00:28














Discharge





- Discharge


Clinical Impression: 


 Need for rhogam due to Rh negative mother, First trimester bleeding





Condition: Good


Disposition: HOME, SELF-CARE


Additional Instructions: 


You were seen today for Rhogam. Please follow closely with your primary care OB/

GYN.  Please return if you develop severe abdominal pain, bleeding that goes 

through more than 2 pads for more than 2 hours, pass out, or have any other 

symptoms that are concerning to you. Please follow-up closely with your OBGYN 

regarding todays visit.

## 2018-12-15 VITALS — DIASTOLIC BLOOD PRESSURE: 68 MMHG | SYSTOLIC BLOOD PRESSURE: 102 MMHG

## 2019-06-19 ENCOUNTER — HOSPITAL ENCOUNTER (OUTPATIENT)
Dept: HOSPITAL 62 - LC | Age: 24
Setting detail: OBSERVATION
LOS: 1 days | Discharge: HOME | End: 2019-06-20
Attending: OBSTETRICS & GYNECOLOGY | Admitting: OBSTETRICS & GYNECOLOGY
Payer: COMMERCIAL

## 2019-06-19 DIAGNOSIS — O60.03: Primary | ICD-10-CM

## 2019-06-19 LAB
APPEARANCE UR: CLEAR
APTT PPP: COLORLESS S
BARBITURATES UR QL SCN: NEGATIVE
BILIRUB UR QL STRIP: NEGATIVE
CHLAM PCR: NOT DETECTED
EPITHELIALS (WET MOUNT): (no result)
GLUCOSE UR STRIP-MCNC: NEGATIVE MG/DL
KETONES UR STRIP-MCNC: NEGATIVE MG/DL
METHADONE UR QL SCN: NEGATIVE
NITRITE UR QL STRIP: NEGATIVE
PCP UR QL SCN: NEGATIVE
PH UR STRIP: 7 [PH] (ref 5–9)
PROT UR STRIP-MCNC: NEGATIVE MG/DL
RBCS (WET MOUNT): (no result)
SP GR UR STRIP: 1
T.VAGINALIS (WET MOUNT): (no result)
URINE AMPHETAMINES SCREEN: NEGATIVE
URINE BENZODIAZEPINES SCREEN: NEGATIVE
URINE COCAINE SCREEN: NEGATIVE
URINE MARIJUANA (THC) SCREEN: NEGATIVE
UROBILINOGEN UR-MCNC: NEGATIVE MG/DL (ref ?–2)
WBCS (WET MOUNT): (no result)
YEAST (WET MOUNT): (no result)

## 2019-06-19 PROCEDURE — 87081 CULTURE SCREEN ONLY: CPT

## 2019-06-19 PROCEDURE — 81001 URINALYSIS AUTO W/SCOPE: CPT

## 2019-06-19 PROCEDURE — 87491 CHLMYD TRACH DNA AMP PROBE: CPT

## 2019-06-19 PROCEDURE — 87591 N.GONORRHOEAE DNA AMP PROB: CPT

## 2019-06-19 PROCEDURE — 59025 FETAL NON-STRESS TEST: CPT

## 2019-06-19 PROCEDURE — 80307 DRUG TEST PRSMV CHEM ANLYZR: CPT

## 2019-06-19 PROCEDURE — 87210 SMEAR WET MOUNT SALINE/INK: CPT

## 2019-06-20 NOTE — ADMISSION PHYSICAL
=================================================================



=================================================================

Datetime Report Generated by CPN: 2019 06:51

   

   

=================================================================

CURRENT ADMISSION

=================================================================

   

Chief Complaint:  Uterine Contractions

Indication for Induction:  Not Applicable

Admit Impression :  , Intrauterine Pregnancy; No Active Labor;

   Observation/Evaluation

Admit Plan:  Admit to Unit; Observation/Evaluation

   

=================================================================

ALLERGIES

=================================================================

   

Medication Allergies:  No

Medication Allergies:  No Known Allergies (2018)

Latex:  No Latex Allergies

   

=================================================================

OBSTETRICAL HISTORY

=================================================================

   

EDC:  2019 00:00

:  1

Para:  0

   

=================================================================

***SEE PRENATAL RECORDS***

=================================================================

   

Alcohol:  No

Marijuana :  No

Cocaine:  No

Other Illicit Drugs:  No

Cigarettes:  Never Smoker. [id number]

   

=================================================================

PHYSICAL EXAM

=================================================================

   

General:  Normal

HEENT:  Normal

Neurologic:  Normal

Thyroid:  Deferred

Heart:  Normal

Lungs:  Normal

Breast:  Deferred

Back:  Normal

Abdomen:  Normal

Genitourinary Exam:  Normal

Extremities:  Normal

DTRs:  Normal

Pelvic Type:  Adequate

Vital Signs:  Reviewed

   

=================================================================

VAGINAL EXAM

=================================================================

   

Dilatation:  1

Effacement:  60

Station:  0

Contraction Comments:  q 2-3

   

=================================================================

MEMBRANES

=================================================================

   

Membranes:  Intact

   

=================================================================

FETUS A

=================================================================

   

EGA:  33.0

Monitoring:  External US

FHR- Baseline:  130

Variability:  Moderate 6-25bpm

Accelerations:  15X15

Decelerations:  None

FHR Category:  Category I

Fetal Presentation:  Vertex

Admit Comment:  25yo  at 33+1ega presents with c/o contractions. 

   She was noted to be having contractions q 2minutes painfully. 

   Vistaril did not help to dissipate patients discomfort.  Rh

   negative.  Declined pap smear on new OB visit.  Admit to labor and

   delivery.  Celestone 12mg IM x 1 then repeat in 24 hrs.  Cervical

   exam 1.5cm dilated and stable after Procardia 10mg Q 6 hours.  If

   procardia continues to help with contractions and cvx doesnt change

   likely able to go home after next BMZ.  

   

=================================================================

PLANS FOR LABOR AND DELIVERY

=================================================================

   

Labor and Delivery:  None

Pain Management:  Medications; Epidural

Feeding Preference:  Breast

Benefit of Breast Feed Discussed:  Yes

Circumcision:  Yes

   

=================================================================

INFORMED CONSENT

=================================================================

   

Informed Consent Obtained:  Vaginal Delivery; Risks, Benefits and

   Alternatives Discussed

Signature:  Electronically signed by Vianey Clinton MD (HOFKE) on

   2019 at 06:50  with User ID: [email or web address]

## 2019-07-11 ENCOUNTER — HOSPITAL ENCOUNTER (OUTPATIENT)
Dept: HOSPITAL 62 - LC | Age: 24
Discharge: HOME | End: 2019-07-11
Attending: OBSTETRICS & GYNECOLOGY
Payer: COMMERCIAL

## 2019-07-11 DIAGNOSIS — Z3A.36: ICD-10-CM

## 2019-07-11 DIAGNOSIS — O47.03: Primary | ICD-10-CM

## 2019-07-11 LAB
APPEARANCE UR: CLEAR
APTT PPP: YELLOW S
BARBITURATES UR QL SCN: NEGATIVE
BILIRUB UR QL STRIP: NEGATIVE
GLUCOSE UR STRIP-MCNC: NEGATIVE MG/DL
KETONES UR STRIP-MCNC: (no result) MG/DL
METHADONE UR QL SCN: NEGATIVE
NITRITE UR QL STRIP: NEGATIVE
PCP UR QL SCN: NEGATIVE
PH UR STRIP: 7 [PH] (ref 5–9)
PROT UR STRIP-MCNC: NEGATIVE MG/DL
SP GR UR STRIP: 1.02
URINE AMPHETAMINES SCREEN: NEGATIVE
URINE BENZODIAZEPINES SCREEN: NEGATIVE
URINE COCAINE SCREEN: NEGATIVE
URINE MARIJUANA (THC) SCREEN: NEGATIVE
UROBILINOGEN UR-MCNC: NEGATIVE MG/DL (ref ?–2)

## 2019-07-11 PROCEDURE — 81001 URINALYSIS AUTO W/SCOPE: CPT

## 2019-07-11 PROCEDURE — 4A1HXCZ MONITORING OF PRODUCTS OF CONCEPTION, CARDIAC RATE, EXTERNAL APPROACH: ICD-10-PCS | Performed by: OBSTETRICS & GYNECOLOGY

## 2019-07-11 PROCEDURE — 80307 DRUG TEST PRSMV CHEM ANLYZR: CPT

## 2019-07-11 PROCEDURE — 84112 EVAL AMNIOTIC FLUID PROTEIN: CPT

## 2019-07-11 PROCEDURE — 59025 FETAL NON-STRESS TEST: CPT

## 2019-07-11 NOTE — NON STRESS TEST REPORT
=================================================================

Non Stress Test

=================================================================

Datetime Report Generated by CPN: 2019 11:48

   

   

=================================================================

DEMOGRAPHIC

=================================================================

   

EGA NST:  33.1

   

=================================================================

INDICATION

=================================================================

   

Indication for Study:   labor

   

=================================================================

VITAL SIGNS

=================================================================

   

Temperature - NST:  98.1

Pulse - NST:  102

RESP - NST:  18

NBPSYS NST:  114

NBPDIA NST:  68

   

=================================================================

MONITORING

=================================================================

   

Monitor Explained:  Monitor Explained; Test Explained; Patient

   Verbalized Understanding

Time on Monitor:  2019 07:30

Time off Monitor:  2019 07:50

NST Duration:  20

   

=================================================================

NST INTERVENTIONS

=================================================================

   

NST Interventions:  PO Hydration; IV Fluids; Reposition Patient

Physician Notified NST:  J Worley CNM

BABY A:  [id number]

   

=================================================================

BABY A

=================================================================

   

Fetal Movement :  Present

Contraction Frequency :  irregular

FHR Baseline :  135

Accelerations :  15X15

Decelerations :  None

Variability :  Moderate 6-25bpm

NST Review:  Meets Criteria for Reactive NST

NST Review and Verified By :  ORLANDO Ricketts RN

NST Results:  Reactive

   

=================================================================

NST REPORT

=================================================================

   

Report Trigger:  Send Report

## 2019-07-25 ENCOUNTER — HOSPITAL ENCOUNTER (OUTPATIENT)
Dept: HOSPITAL 62 - LC | Age: 24
Discharge: HOME | End: 2019-07-25
Attending: OBSTETRICS & GYNECOLOGY
Payer: COMMERCIAL

## 2019-07-25 DIAGNOSIS — O47.1: Primary | ICD-10-CM

## 2019-07-25 DIAGNOSIS — Z3A.38: ICD-10-CM

## 2019-07-25 LAB
APPEARANCE UR: (no result)
APTT PPP: YELLOW S
BARBITURATES UR QL SCN: NEGATIVE
BILIRUB UR QL STRIP: NEGATIVE
GLUCOSE UR STRIP-MCNC: NEGATIVE MG/DL
KETONES UR STRIP-MCNC: NEGATIVE MG/DL
METHADONE UR QL SCN: NEGATIVE
NITRITE UR QL STRIP: NEGATIVE
PCP UR QL SCN: NEGATIVE
PH UR STRIP: 6 [PH] (ref 5–9)
PROT UR STRIP-MCNC: 30 MG/DL
SP GR UR STRIP: 1.02
URINE AMPHETAMINES SCREEN: NEGATIVE
URINE BENZODIAZEPINES SCREEN: NEGATIVE
URINE COCAINE SCREEN: NEGATIVE
URINE MARIJUANA (THC) SCREEN: NEGATIVE
UROBILINOGEN UR-MCNC: NEGATIVE MG/DL (ref ?–2)

## 2019-07-25 PROCEDURE — 4A1HXCZ MONITORING OF PRODUCTS OF CONCEPTION, CARDIAC RATE, EXTERNAL APPROACH: ICD-10-PCS | Performed by: OBSTETRICS & GYNECOLOGY

## 2019-07-25 PROCEDURE — 81005 URINALYSIS: CPT

## 2019-07-25 PROCEDURE — 80307 DRUG TEST PRSMV CHEM ANLYZR: CPT

## 2019-07-25 PROCEDURE — 76815 OB US LIMITED FETUS(S): CPT

## 2019-07-25 PROCEDURE — 59025 FETAL NON-STRESS TEST: CPT

## 2019-07-25 NOTE — RADIOLOGY REPORT (SQ)
EXAM DESCRIPTION:  U/S OB LIMITED



COMPLETED DATE/TIME:  7/25/2019 2:24 pm



REASON FOR STUDY:  cervical lenght vaginal bleeding possible bleeding



COMPARISON:  None.



TECHNIQUE:  Limited transabdominal grayscale ultrasound for evaluation of specific requested obstetri
geneva parameters.



LIMITATIONS:  None.



FINDINGS:  CERVICAL LENGTH: 1.1 cm   Closed.

CHARLIE: 13.3 cm.

FHR: 141 beats per minute.

PRESENTATION: Cephalic.

PLACENTA:  Anterior.

FETAL ANATOMY: Not assessed

OTHER: No other significant findings.



IMPRESSION:  LIMITED OBSTETRICAL ULTRASOUND WITH MEASURED PARAMETERS DELINEATED ABOVE.

Trimester of pregnancy: Third trimester - 28 weeks to delivery.



TECHNICAL DOCUMENTATION:  JOB ID:  [id number]

 2011 [employer]- All Rights Reserved



Reading location - IP/workstation name: [email or web address]

## 2019-07-25 NOTE — NON STRESS TEST REPORT
=================================================================

Non Stress Test

=================================================================

Datetime Report Generated by CPN: 07/25/2019 14:56

   

   

=================================================================

DEMOGRAPHIC

=================================================================

   

EGA NST:  38.1

EGA NST:  36.1

   

=================================================================

INDICATION

=================================================================

   

Indication for Study:  Ordered by Provider

Indication for Study:  Other

Indication for Study (NST) Other:  LABOR CHECK

   

=================================================================

MONITORING

=================================================================

   

Monitor Explained:  Monitor Explained; Test Explained; Patient

   Verbalized Understanding

Monitor Explained:  Monitor Explained; Test Explained; Patient

   Verbalized Understanding

Time on Monitor:  07/25/2019 12:31

Time on Monitor:  07/11/2019 11:52

Time off Monitor:  07/25/2019 14:49

Time off Monitor:  07/11/2019 13:27

NST Duration:  138

NST Duration:  95

   

=================================================================

NST INTERVENTIONS

=================================================================

   

NST Interventions:  PO Hydration

NST Interventions:  PO Hydration; Reposition Patient

Physician Notified NST:  XAVIER Jeffery CNM

Physician Notified NST:  BRENDA JEFFERY CNM

BABY A:  [id number]

   

=================================================================

BABY A

=================================================================

   

Fetal Movement :  Present

Fetal Movement :  Present

Contraction Frequency :  IRREGULAR

Contraction Frequency :  RARE

FHR Baseline :  130

FHR Baseline :  140

Accelerations :  15X15

Accelerations :  15X15

Decelerations :  None

Decelerations :  None

Variability :  Moderate 6-25bpm

Variability :  Moderate 6-25bpm

NST Review:  Meets Criteria for Reactive NST

NST Review:  Meets Criteria for Reactive NST

NST Review and Verified By :  WALT Boyd

NST Review and Verified By :  RHONA Kaufman RN

NST Results:  Reactive

NST Results:  Reactive

   

=================================================================

NST REPORT

=================================================================

   

Report Trigger:  Send Report

## 2020-08-05 ENCOUNTER — HOSPITAL ENCOUNTER (EMERGENCY)
Dept: HOSPITAL 62 - ER | Age: 25
Discharge: HOME | End: 2020-08-05
Payer: COMMERCIAL

## 2020-08-05 VITALS — DIASTOLIC BLOOD PRESSURE: 72 MMHG | SYSTOLIC BLOOD PRESSURE: 130 MMHG

## 2020-08-05 DIAGNOSIS — D64.9: ICD-10-CM

## 2020-08-05 DIAGNOSIS — Z3A.18: ICD-10-CM

## 2020-08-05 DIAGNOSIS — O26.892: Primary | ICD-10-CM

## 2020-08-05 DIAGNOSIS — O99.012: ICD-10-CM

## 2020-08-05 DIAGNOSIS — O99.112: ICD-10-CM

## 2020-08-05 DIAGNOSIS — R19.8: ICD-10-CM

## 2020-08-05 DIAGNOSIS — D72.829: ICD-10-CM

## 2020-08-05 LAB
ADD MANUAL DIFF: NO
ALBUMIN SERPL-MCNC: 4 G/DL (ref 3.5–5)
ALP SERPL-CCNC: 77 U/L (ref 38–126)
ANION GAP SERPL CALC-SCNC: 8 MMOL/L (ref 5–19)
APPEARANCE UR: CLEAR
APTT PPP: COLORLESS S
AST SERPL-CCNC: 21 U/L (ref 14–36)
BASOPHILS # BLD AUTO: 0.1 10^3/UL (ref 0–0.2)
BASOPHILS NFR BLD AUTO: 0.4 % (ref 0–2)
BILIRUB DIRECT SERPL-MCNC: 0 MG/DL (ref 0–0.4)
BILIRUB SERPL-MCNC: 0.3 MG/DL (ref 0.2–1.3)
BILIRUB UR QL STRIP: NEGATIVE
BUN SERPL-MCNC: 6 MG/DL (ref 7–20)
CALCIUM: 9 MG/DL (ref 8.4–10.2)
CHLAM PCR: NOT DETECTED
CHLORIDE SERPL-SCNC: 105 MMOL/L (ref 98–107)
CO2 SERPL-SCNC: 21 MMOL/L (ref 22–30)
EOSINOPHIL # BLD AUTO: 0.1 10^3/UL (ref 0–0.6)
EOSINOPHIL NFR BLD AUTO: 0.7 % (ref 0–6)
EPITHELIALS (WET MOUNT): (no result)
ERYTHROCYTE [DISTWIDTH] IN BLOOD BY AUTOMATED COUNT: 13.9 % (ref 11.5–14)
GLUCOSE SERPL-MCNC: 91 MG/DL (ref 75–110)
GLUCOSE UR STRIP-MCNC: NEGATIVE MG/DL
HCT VFR BLD CALC: 35.2 % (ref 36–47)
HGB BLD-MCNC: 11.8 G/DL (ref 12–15.5)
KETONES UR STRIP-MCNC: NEGATIVE MG/DL
LYMPHOCYTES # BLD AUTO: 1.2 10^3/UL (ref 0.5–4.7)
LYMPHOCYTES NFR BLD AUTO: 9.2 % (ref 13–45)
MCH RBC QN AUTO: 28 PG (ref 27–33.4)
MCHC RBC AUTO-ENTMCNC: 33.4 G/DL (ref 32–36)
MCV RBC AUTO: 84 FL (ref 80–97)
MONOCYTES # BLD AUTO: 0.5 10^3/UL (ref 0.1–1.4)
MONOCYTES NFR BLD AUTO: 3.6 % (ref 3–13)
NEUTROPHILS # BLD AUTO: 11.3 10^3/UL (ref 1.7–8.2)
NEUTS SEG NFR BLD AUTO: 86.1 % (ref 42–78)
PH UR STRIP: 7 [PH] (ref 5–9)
PLATELET # BLD: 305 10^3/UL (ref 150–450)
POTASSIUM SERPL-SCNC: 4.7 MMOL/L (ref 3.6–5)
PROT SERPL-MCNC: 7.3 G/DL (ref 6.3–8.2)
PROT UR STRIP-MCNC: NEGATIVE MG/DL
RBC # BLD AUTO: 4.2 10^6/UL (ref 3.72–5.28)
SP GR UR STRIP: 1
T.VAGINALIS (WET MOUNT): (no result)
TOTAL CELLS COUNTED % (AUTO): 100 %
UROBILINOGEN UR-MCNC: NEGATIVE MG/DL (ref ?–2)
WBC # BLD AUTO: 13.1 10^3/UL (ref 4–10.5)
WBCS (WET MOUNT): (no result)
YEAST (WET MOUNT): (no result)

## 2020-08-05 PROCEDURE — 99284 EMERGENCY DEPT VISIT MOD MDM: CPT

## 2020-08-05 PROCEDURE — 87210 SMEAR WET MOUNT SALINE/INK: CPT

## 2020-08-05 PROCEDURE — 76805 OB US >/= 14 WKS SNGL FETUS: CPT

## 2020-08-05 PROCEDURE — 80053 COMPREHEN METABOLIC PANEL: CPT

## 2020-08-05 PROCEDURE — 87491 CHLMYD TRACH DNA AMP PROBE: CPT

## 2020-08-05 PROCEDURE — 85025 COMPLETE CBC W/AUTO DIFF WBC: CPT

## 2020-08-05 PROCEDURE — 36415 COLL VENOUS BLD VENIPUNCTURE: CPT

## 2020-08-05 PROCEDURE — 81001 URINALYSIS AUTO W/SCOPE: CPT

## 2020-08-05 PROCEDURE — 87591 N.GONORRHOEAE DNA AMP PROB: CPT

## 2020-08-05 NOTE — ER DOCUMENT REPORT
ED General





- General


Chief Complaint: OB Problem (<20wks)


Stated Complaint: VAGINAL  DISCHARGE


Time Seen by Provider: 08/05/20 15:14


Primary Care Provider: 


SUHAS ZAFAR MD [ACTIVE STAFF] - Follow up as needed


Notes: 





25-year-old female who is a G2, P1 at 18 weeks gestation presents emergency 

department complaining of lower abdominal pressure feeling like she needed to 

urinate earlier today.  States she got into the shower and felt like she was 

urinating on herself but when she looked she saw clear liquid coming out of her 

vagina and stated she saw some sort of a bag or something else bulging out from 

her vagina that looked like it had a hole in it.  She states that is what looked

like it was leaking.





Denies bloody discharge, denies lower abdominal pain, denies abdominal or 

vaginal trauma.  Patient has a history of cervical shortening, at 16 weeks she 

was measured to be 2.9 cm.  Today she was supposed have an appointment [facility] 

[facility] but when she started having fluid leaking she

came to the emergency department instead.


TRAVEL OUTSIDE OF THE [place] IN LAST 30 DAYS: No





- Related Data


Allergies/Adverse Reactions: 


                                        





No Known Allergies Allergy (Verified 06/20/19 07:50)


   











Past Medical History





- General


Information source: Patient





- Social History


Smoking Status: Never Smoker


Chew tobacco use (# tins/day): No


Frequency of alcohol use: None


Drug Abuse: None


Family History: Reviewed & Not Pertinent


Renal/ Medical History: Denies: Hx Peritoneal Dialysis


Past Surgical History: Reports: Hx Oral Surgery, Hx Orthopedic Surgery - left 

knee, Hx Tonsillectomy





Review of Systems





- Review of Systems


Constitutional: No symptoms reported


Gastrointestinal: See HPI


Female Genitourinary: See HPI


-: Yes All other systems reviewed and negative





Physical Exam





- Vital signs


Vitals: 


                                        











Temp Pulse Resp BP Pulse Ox


 


 98.6 F   125 H  16   145/78 H  100 


 


 08/05/20 15:01  08/05/20 15:01  08/05/20 15:01  08/05/20 15:01  08/05/20 15:01











Interpretation: Hypertensive, Tachycardic





- Notes


Notes: 





GENERAL: Alert, interacts well.  No acute distress.


HEAD: Normocephalic, atraumatic


EYES: Pupils equal, round and reactive to light, extraocular movements intact.


ENT: Oral mucosa moist, tongue midline. 


NECK: Full range of motion, supple, trachea midline.


LUNGS: Clear to auscultation bilaterally, no wheezes, rales or rhonchi, no 

respiratory distress.


HEART: Regular rate and rhythm, no murmurs, gallops, rubs.  


ABDOMEN: Soft, gravid, appropriate for dates, nontender, no guarding, nondisten

ded, bowel sounds present in all 4 quadrants.  


: Chaperoned by WALT Lakhani.  Pelvic examination reveals a closed cervix.  No 

discharge is noted, no fluid even while bearing down.  No evidence of cervical 

dilation.  Patient states that it is more easily seen while standing up so the 

patient then stood up and I had her bear down, there was a small amount of 

bulging of her bladder and her urethra was able to be visualized however no 

fluid was leaking, I did not see her cervix and again I did not see membranes or

amniotic fluid.


EXTREMITIES: Moves all 4 extremities spontaneously, no edema, radial and 

dorsalis pedis pulses 2/4 bilaterally.  No cyanosis.  


NEUROLOGICAL: Alert and oriented x3, normal speech.


PSYCH: anxious.


SKIN: Warm, Dry, normal turgor, no rashes or lesions noted.





Course





- Re-evaluation


Re-evalutation: 





08/05/20 20:21


Pelvic examination does not reveal any sign of cervical dilation, no fluid is 

seen when she bears down, when the patient stands and bears down all I see is 

her urethra.  There is no sign of ruptured membranes.  CBC shows leukocytosis of

13.1, this is fairly common in pregnancy, mild anemia with hemoglobin 11.8, 

patient is counseled to use prenatal vitamins with iron, CMP shows slight low 

sodium at 21 otherwise unremarkable, urinalysis unremarkable, ferning pattern is

absent on the ferning test.  Wet prep does not show any signs of trichomonas or 

bacterial vaginosis.  Gonorrhea and Chlamydia are pending.  Amount of amniotic 

fluid on ultrasound appears appropriate at bedside and on formal ultrasound.  No

evidence of cervical shortening or dilation.  Discussed with Dr. Pickens who rec

ommends the patient come to the office tomorrow to have a repeat ultrasound done

for transvaginal measurement of her cervix.  Discharged home.





                                        





Obstetrics Ultrasound  08/05/20 15:51


IMPRESSION:  LIVING INTRAUTERINE PREGNANCY.


ESTIMATED GESTATIONAL AGE 18 weeks 5 days.


NO VISUALIZED ANOMALIES.


Trimester of pregnancy: Second trimester - 13 weeks 1 day to 27 weeks 6 days.


 














- Vital Signs


Vital signs: 


                                        











Temp Pulse Resp BP Pulse Ox


 


 98.7 F   74   18   133/76 H  99 


 


 08/05/20 19:37  08/05/20 19:37  08/05/20 19:37  08/05/20 19:37  08/05/20 19:37














- Laboratory


Result Diagrams: 


                                 08/05/20 16:27





                                 08/05/20 16:27


Laboratory results interpreted by me: 


                                        











  08/05/20 08/05/20





  16:27 16:27


 


WBC  13.1 H 


 


Hgb  11.8 L 


 


Hct  35.2 L 


 


Lymph % (Auto)  9.2 L 


 


Absolute Neuts (auto)  11.3 H 


 


Seg Neutrophils %  86.1 H 


 


Sodium   133.8 L


 


Carbon Dioxide   21 L


 


BUN   6 L


 


Creatinine   0.44 L














Discharge





- Discharge


Clinical Impression: 


 Second trimester pregnancy





Condition: Stable


Disposition: HOME, SELF-CARE


Additional Instructions: 


Today we did not find any signs of ruptured membranes.  You are 18 weeks and 5 

days pregnant.  Your ultrasound looks good.  I did discuss your case with Dr. Pickens, he would like you to follow-up with them in the office tomorrow.  They 

can do a repeat ultrasound there to get a transvaginal measurement of your 

cervix.  Here today it was 3.8.  Do not do any heavy lifting until you are 

cleared by OB/GYN.  Do not have sex or put anything in your vagina until you are

cleared by OB/GYN.


Referrals: 


SUHAS ZAFAR MD [ACTIVE STAFF] - Follow up as needed

## 2020-08-05 NOTE — RADIOLOGY REPORT (SQ)
EXAM DESCRIPTION:  U/S OB 14+ TRNABD 1GES W/O DOP



IMAGES COMPLETED DATE/TIME:  8/5/2020 4:24 pm



REASON FOR STUDY:  possible amniotic fluid leak



COMPARISON:  None.



TECHNIQUE:  Static and Dynamic grayscale imaging performed of gravid uterus using transabdominal appr
oach.  Additional selected color Doppler and spectral images recorded.  All stored on PACS.



LIMITATIONS:  None.



FINDINGS:  FETUSES SEEN:1

EGA: 18 weeks 5 days  Calculated using BPD,FL,HC,AC documented on images. No discrepancy with clinica
l dates.

VERONICA: 1/1/2021

EFW: 256 grams

PERCENTILE: Not applicable. Fetus less than or equal to 20 weeks gestation.

LVP: 5.7 x 6.7 cm.

PLACENTA: Posterior  GRADE: I

FETAL PRESENTATION: Variable.

FETAL HEART RATE: 143 beats per minute.

Fetal anatomical survey was not performed.

MATERNAL ADNEXA: Maternal ovaries not visualized.

CERVICAL LENGTH: 3.8 cm.   Closed.

OTHER: No other significant finding.



IMPRESSION:  LIVING INTRAUTERINE PREGNANCY.

ESTIMATED GESTATIONAL AGE 18 weeks 5 days.

NO VISUALIZED ANOMALIES.

Trimester of pregnancy: Second trimester - 13 weeks 1 day to 27 weeks 6 days.



TECHNICAL DOCUMENTATION:  JOB ID:  [id number]

 2011 [employer]- All Rights Reserved



Reading location - IP/workstation name: [email or web address]

## 2020-08-05 NOTE — ER DOCUMENT REPORT
ED Medical Screen (RME)





- General


Stated Complaint: VAGINAL  DISCHARGE


Primary Care Provider: 


SUHAS ZAFAR MD [Primary Care Provider] - Follow up as needed


Notes: 





Patient is 25-year-old white female who is  at approximately 18 weeks 

gestation who presents to the emergency department with a chief complaint of 

pressure in the vagina and pelvis.  States that she felt some liquid in the 

vaginal area, looked and noticed upon spreading her labia what appears to be a 

flesh-colored sac with a pinpoint hole in it that is draining fluid.  She states

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  [facility]

[facility] and has had discussions of possible cerclage but 

reports that the OB/GYN she is seeing now was not comfortable placing a cerclage

as a felt that they would rupture her membranes.  Patient denies any pain or 

bleeding.





Charge nurse notified of patient status and need for immediate room and pelvic 

exam.


I have treated and performed a rapid initial assessment of this patient.  A 

comprehensive ED assessment and evaluation of the patient, analysis of test 

results and completion of medical decision making process will be conducted by 

additional ED providers.





PHYSICAL EXAMINATION:





GENERAL: Well-appearing, well-nourished and in no acute distress.  A&Ox4.  

Answers questions appropriately.


TRAVEL OUTSIDE OF THE [place] IN LAST 30 DAYS: No





- Related Data


Allergies/Adverse Reactions: 


                                        





No Known Allergies Allergy (Verified 19 07:50)


   











Past Medical History


Renal/ Medical History: Denies: Hx Peritoneal Dialysis


Past Surgical History: Reports: Hx Oral Surgery, Hx Orthopedic Surgery - left 

knee, Hx Tonsillectomy





Physical Exam





- Vital signs


Vitals: 





                                        











Temp Pulse Resp BP Pulse Ox


 


 98.6 F   125 H  16   145/78 H  100 


 


 20 15:20 15:20 15:20 15:01  20 15:01














Course





- Vital Signs


Vital signs: 





                                        











Temp Pulse Resp BP Pulse Ox


 


 98.6 F   125 H  16   145/78 H  100 


 


 20 15:01  20 15:01  20 15:01  20 15:01  20 15:01














Doctor's Discharge





- Discharge


Referrals: 


SUHAS ZAFAR MD [Primary Care Provider] - Follow up as needed

## 2020-10-09 ENCOUNTER — HOSPITAL ENCOUNTER (OUTPATIENT)
Dept: HOSPITAL 62 - LC | Age: 25
Discharge: HOME | End: 2020-10-09
Attending: OBSTETRICS & GYNECOLOGY
Payer: COMMERCIAL

## 2020-10-09 DIAGNOSIS — O47.02: Primary | ICD-10-CM

## 2020-10-09 DIAGNOSIS — Z3A.27: ICD-10-CM

## 2020-10-09 LAB
APPEARANCE UR: CLEAR
APTT PPP: (no result) S
BARBITURATES UR QL SCN: NEGATIVE
BILIRUB UR QL STRIP: NEGATIVE
GLUCOSE UR STRIP-MCNC: NEGATIVE MG/DL
KETONES UR STRIP-MCNC: NEGATIVE MG/DL
METHADONE UR QL SCN: NEGATIVE
NITRITE UR QL STRIP: NEGATIVE
PCP UR QL SCN: NEGATIVE
PH UR STRIP: 7 [PH] (ref 5–9)
PROT UR STRIP-MCNC: NEGATIVE MG/DL
SP GR UR STRIP: 1.01
URINE AMPHETAMINES SCREEN: NEGATIVE
URINE BENZODIAZEPINES SCREEN: NEGATIVE
URINE COCAINE SCREEN: NEGATIVE
URINE MARIJUANA (THC) SCREEN: NEGATIVE
UROBILINOGEN UR-MCNC: NEGATIVE MG/DL (ref ?–2)

## 2020-10-09 PROCEDURE — 80307 DRUG TEST PRSMV CHEM ANLYZR: CPT

## 2020-10-09 PROCEDURE — 81001 URINALYSIS AUTO W/SCOPE: CPT

## 2020-12-05 ENCOUNTER — HOSPITAL ENCOUNTER (OUTPATIENT)
Dept: HOSPITAL 62 - LC | Age: 25
Discharge: HOME | End: 2020-12-05
Attending: OBSTETRICS & GYNECOLOGY
Payer: COMMERCIAL

## 2020-12-05 DIAGNOSIS — O47.03: Primary | ICD-10-CM

## 2020-12-05 DIAGNOSIS — Z3A.35: ICD-10-CM

## 2020-12-05 LAB
APPEARANCE UR: CLEAR
APTT PPP: (no result) S
BARBITURATES UR QL SCN: NEGATIVE
BILIRUB UR QL STRIP: NEGATIVE
GLUCOSE UR STRIP-MCNC: NEGATIVE MG/DL
KETONES UR STRIP-MCNC: NEGATIVE MG/DL
METHADONE UR QL SCN: NEGATIVE
NITRITE UR QL STRIP: NEGATIVE
PCP UR QL SCN: NEGATIVE
PH UR STRIP: 6 [PH] (ref 5–9)
PROT UR STRIP-MCNC: NEGATIVE MG/DL
SP GR UR STRIP: 1.01
URINE AMPHETAMINES SCREEN: NEGATIVE
URINE BENZODIAZEPINES SCREEN: NEGATIVE
URINE COCAINE SCREEN: NEGATIVE
URINE MARIJUANA (THC) SCREEN: NEGATIVE
UROBILINOGEN UR-MCNC: NEGATIVE MG/DL (ref ?–2)

## 2020-12-05 PROCEDURE — 59025 FETAL NON-STRESS TEST: CPT

## 2020-12-05 PROCEDURE — 80307 DRUG TEST PRSMV CHEM ANLYZR: CPT

## 2020-12-05 PROCEDURE — 84112 EVAL AMNIOTIC FLUID PROTEIN: CPT

## 2020-12-05 PROCEDURE — 81001 URINALYSIS AUTO W/SCOPE: CPT

## 2020-12-05 NOTE — NON STRESS TEST REPORT
=================================================================

Non Stress Test

=================================================================

Datetime Report Generated by CPN: 12/05/2020 20:34

   

   

=================================================================

DEMOGRAPHIC

=================================================================

   

EGA NST:  36.0

   

=================================================================

INDICATION

=================================================================

   

Indication for Study (NST) Other:  Gestational age greater than 32 weeks

   

=================================================================

VITAL SIGNS

=================================================================

   

Temperature - NST:  98.4

Pulse - NST:  133

RESP - NST:  17

NBPSYS NST:  110

NBPDIA NST:  78

   

=================================================================

MONITORING

=================================================================

   

Monitor Explained:  Monitor Explained; Test Explained; Patient

   Verbalized Understanding

Time on Monitor:  12/05/2020 19:18

Time off Monitor:  12/05/2020 20:22

NST Duration:  64

   

=================================================================

NST INTERVENTIONS

=================================================================

   

NST Interventions:  PO Hydration; Reposition Patient

Physician Notified NST:  Dr. Venancio

BABY A:  [id number]

   

=================================================================

BABY A

=================================================================

   

Fetal Movement :  Present

Contraction Frequency :  irregular

FHR Baseline :  135

Accelerations :  15X15

Variability :  Moderate 6-25bpm

NST Review:  Meets Criteria for Reactive NST

NST Review and Verified By :  ORLANDO Ricketts RN

NST Results:  Reactive

   

=================================================================

NST REPORT

=================================================================

   

Report Trigger:  Send Report

## 2020-12-23 ENCOUNTER — HOSPITAL ENCOUNTER (OUTPATIENT)
Dept: HOSPITAL 62 - LC | Age: 25
Discharge: HOME | End: 2020-12-23
Attending: OBSTETRICS & GYNECOLOGY
Payer: COMMERCIAL

## 2020-12-23 DIAGNOSIS — O47.1: Primary | ICD-10-CM

## 2020-12-23 DIAGNOSIS — Z88.8: ICD-10-CM

## 2020-12-23 DIAGNOSIS — Z3A.38: ICD-10-CM

## 2020-12-23 PROCEDURE — 59025 FETAL NON-STRESS TEST: CPT

## 2020-12-23 PROCEDURE — 81005 URINALYSIS: CPT

## 2020-12-23 PROCEDURE — 80307 DRUG TEST PRSMV CHEM ANLYZR: CPT

## 2020-12-23 NOTE — NON STRESS TEST REPORT
=================================================================

Non Stress Test

=================================================================

Datetime Report Generated by CPN: 12/23/2020 21:00

   

   

=================================================================

DEMOGRAPHIC

=================================================================

   

EGA NST:  38.4

   

=================================================================

INDICATION

=================================================================

   

Indication for Study (NST) Other:  Gestational age greater than 32 weeks

   

=================================================================

VITAL SIGNS

=================================================================

   

Temperature - NST:  99.0

Pulse - NST:  100

RESP - NST:  16

NBPSYS NST:  118

NBPDIA NST:  75

   

=================================================================

MONITORING

=================================================================

   

Monitor Explained:  Monitor Explained; Test Explained; Patient

   Verbalized Understanding

Time on Monitor:  12/23/2020 20:06

Time off Monitor:  12/23/2020 20:41

NST Duration:  35

   

=================================================================

NST INTERVENTIONS

=================================================================

   

NST Interventions:  PO Hydration; Reposition Patient

Physician Notified NST:  Dr. Lua

BABY A:  [id number]

   

=================================================================

BABY A

=================================================================

   

Fetal Movement :  Present

Contraction Frequency :  2-6

FHR Baseline :  135

Accelerations :  15X15

Decelerations :  None

Variability :  Moderate 6-25bpm

NST Review:  Meets Criteria for Reactive NST

NST Review and Verified By :  LEILANI Flowers RN

NST Results:  Reactive

   

=================================================================

NST REPORT

=================================================================

   

Report Trigger:  Send Report

## 2020-12-29 ENCOUNTER — HOSPITAL ENCOUNTER (INPATIENT)
Dept: HOSPITAL 62 - LR | Age: 25
LOS: 1 days | Discharge: HOME | End: 2020-12-30
Attending: OBSTETRICS & GYNECOLOGY | Admitting: OBSTETRICS & GYNECOLOGY
Payer: COMMERCIAL

## 2020-12-29 DIAGNOSIS — Z3A.39: ICD-10-CM

## 2020-12-29 DIAGNOSIS — Z67.41: ICD-10-CM

## 2020-12-29 DIAGNOSIS — O26.893: ICD-10-CM

## 2020-12-29 LAB
ADD MANUAL DIFF: NO
BASOPHILS # BLD AUTO: 0.1 10^3/UL (ref 0–0.2)
BASOPHILS NFR BLD AUTO: 0.4 % (ref 0–2)
EOSINOPHIL # BLD AUTO: 0.1 10^3/UL (ref 0–0.6)
EOSINOPHIL NFR BLD AUTO: 0.5 % (ref 0–6)
ERYTHROCYTE [DISTWIDTH] IN BLOOD BY AUTOMATED COUNT: 18.9 % (ref 11.5–14)
HCT VFR BLD CALC: 33.9 % (ref 36–47)
HGB BLD-MCNC: 11.5 G/DL (ref 12–15.5)
LYMPHOCYTES # BLD AUTO: 1.4 10^3/UL (ref 0.5–4.7)
LYMPHOCYTES NFR BLD AUTO: 8.8 % (ref 13–45)
MCH RBC QN AUTO: 26.4 PG (ref 27–33.4)
MCHC RBC AUTO-ENTMCNC: 33.9 G/DL (ref 32–36)
MCV RBC AUTO: 78 FL (ref 80–97)
MONOCYTES # BLD AUTO: 0.7 10^3/UL (ref 0.1–1.4)
MONOCYTES NFR BLD AUTO: 4.5 % (ref 3–13)
NEUTROPHILS # BLD AUTO: 13.4 10^3/UL (ref 1.7–8.2)
NEUTS SEG NFR BLD AUTO: 85.8 % (ref 42–78)
PLATELET # BLD: 231 10^3/UL (ref 150–450)
RBC # BLD AUTO: 4.35 10^6/UL (ref 3.72–5.28)
TOTAL CELLS COUNTED % (AUTO): 100 %
WBC # BLD AUTO: 15.6 10^3/UL (ref 4–10.5)

## 2020-12-29 PROCEDURE — 36415 COLL VENOUS BLD VENIPUNCTURE: CPT

## 2020-12-29 PROCEDURE — 86850 RBC ANTIBODY SCREEN: CPT

## 2020-12-29 PROCEDURE — 85025 COMPLETE CBC W/AUTO DIFF WBC: CPT

## 2020-12-29 PROCEDURE — 85461 HEMOGLOBIN FETAL: CPT

## 2020-12-29 PROCEDURE — 86870 RBC ANTIBODY IDENTIFICATION: CPT

## 2020-12-29 PROCEDURE — 85027 COMPLETE CBC AUTOMATED: CPT

## 2020-12-29 PROCEDURE — 0KQM0ZZ REPAIR PERINEUM MUSCLE, OPEN APPROACH: ICD-10-PCS | Performed by: OBSTETRICS & GYNECOLOGY

## 2020-12-29 PROCEDURE — 86901 BLOOD TYPING SEROLOGIC RH(D): CPT

## 2020-12-29 PROCEDURE — 86900 BLOOD TYPING SEROLOGIC ABO: CPT

## 2020-12-29 RX ADMIN — IBUPROFEN SCH MG: 800 TABLET, FILM COATED ORAL at 19:08

## 2020-12-29 RX ADMIN — DOCUSATE SODIUM SCH MG: 100 CAPSULE, LIQUID FILLED ORAL at 10:31

## 2020-12-29 RX ADMIN — FERROUS SULFATE TAB 325 MG (65 MG ELEMENTAL FE) SCH MG: 325 (65 FE) TAB at 10:31

## 2020-12-29 RX ADMIN — DOCUSATE SODIUM SCH MG: 100 CAPSULE, LIQUID FILLED ORAL at 18:48

## 2020-12-29 RX ADMIN — Medication SCH: at 13:50

## 2020-12-29 RX ADMIN — SENNOSIDES, DOCUSATE SODIUM SCH EACH: 50; 8.6 TABLET, FILM COATED ORAL at 10:31

## 2020-12-29 RX ADMIN — Medication SCH CAP: at 10:31

## 2020-12-29 RX ADMIN — Medication SCH: at 23:22

## 2020-12-29 RX ADMIN — FERROUS SULFATE TAB 325 MG (65 MG ELEMENTAL FE) SCH MG: 325 (65 FE) TAB at 18:48

## 2020-12-30 VITALS — DIASTOLIC BLOOD PRESSURE: 61 MMHG | SYSTOLIC BLOOD PRESSURE: 109 MMHG

## 2020-12-30 LAB
ERYTHROCYTE [DISTWIDTH] IN BLOOD BY AUTOMATED COUNT: 19.3 % (ref 11.5–14)
HCT VFR BLD CALC: 30.6 % (ref 36–47)
HGB BLD-MCNC: 10.5 G/DL (ref 12–15.5)
MCH RBC QN AUTO: 26.4 PG (ref 27–33.4)
MCHC RBC AUTO-ENTMCNC: 34.3 G/DL (ref 32–36)
MCV RBC AUTO: 77 FL (ref 80–97)
PLATELET # BLD: 185 10^3/UL (ref 150–450)
RBC # BLD AUTO: 3.98 10^6/UL (ref 3.72–5.28)
WBC # BLD AUTO: 13.6 10^3/UL (ref 4–10.5)

## 2020-12-30 RX ADMIN — Medication SCH CAP: at 10:18

## 2020-12-30 RX ADMIN — DOCUSATE SODIUM SCH MG: 100 CAPSULE, LIQUID FILLED ORAL at 10:18

## 2020-12-30 RX ADMIN — IBUPROFEN SCH MG: 800 TABLET, FILM COATED ORAL at 06:06

## 2020-12-30 RX ADMIN — Medication SCH: at 06:13

## 2020-12-30 RX ADMIN — SENNOSIDES, DOCUSATE SODIUM SCH EACH: 50; 8.6 TABLET, FILM COATED ORAL at 10:18

## 2020-12-30 RX ADMIN — FERROUS SULFATE TAB 325 MG (65 MG ELEMENTAL FE) SCH MG: 325 (65 FE) TAB at 10:18

## 2020-12-30 NOTE — PDOC PROGRESS REPORT
Subjective-OB


Progress Note for:: 12/30/20


Subjective: 


Pt doing well, no concerns. She reports light bleeding, reg diet and voiding w/o

difficulty.  She would like to go home today. 








Physical Exam (OB)


Vital Signs: 


                                        











Temp Pulse Resp BP Pulse Ox


 


 98.4 F   87   22 H  109/61   100 


 


 12/30/20 08:00  12/30/20 08:00  12/30/20 08:00  12/30/20 08:00  12/30/20 08:00








                                 Intake & Output











 12/29/20 12/30/20 12/31/20





 06:59 06:59 06:59


 


Intake Total  500 


 


Balance  500 


 


Weight  63.6 kg 














- PIH/Pre-Eclampsia


Clonus: Negative


Headache: Absent


Epigastric Pain: No


Visual Changes: No





- Maternal Morbidity


59. Maternal Morbidity (serious complications experinced by the mother 

associated with labor and delivery: None of the above





- Lochia


Lochia Amount: Scant < 10 ml


Lochia Color: Rubra/Red





- Abdomen


Description: Soft, Round


Hernia Present: No


Fundal Description: Firm, Midline


Fundal Height: u/u - u/2





Objective-Diagnostic


Laboratory: 


                                        





                                 12/30/20 08:23 





                                        











  12/29/20 12/29/20 12/30/20





  10:14 10:14 08:23


 


WBC   15.6 H  13.6 H


 


RBC   4.35  3.98


 


Hgb   11.5 L  10.5 L


 


Hct   33.9 L  30.6 L


 


MCV   78 L  77 L


 


MCH   26.4 L  26.4 L


 


MCHC   33.9  34.3


 


RDW   18.9 H  19.3 H


 


Plt Count   231  185


 


Seg Neutrophils %   85.8 H 


 


Blood Type  O NEGATIVE  


 


Antibody Screen  POSITIVE  














  12/30/20





  08:23


 


WBC 


 


RBC 


 


Hgb 


 


Hct 


 


MCV 


 


MCH 


 


MCHC 


 


RDW 


 


Plt Count 


 


Seg Neutrophils % 


 


Blood Type  O NEGATIVE


 


Antibody Screen 














Assessment and Plan(PN)





- Assessment and Plan


(1) Precipitous delivery


Is this a current diagnosis for this admission?: Yes   





(2) Delivery normal


Is this a current diagnosis for this admission?: Yes   





(3) Obstetrical laceration, second degree


Is this a current diagnosis for this admission?: Yes   





- Time Spent with Patient


Time with patient: Less than 15 minutes


Medications reviewed and adjusted accordingly: Yes





- Disposition


Anticipated Discharge Disposition: Home, Self Care


Anticipated Discharge Timeframe: within 24 hours

## 2020-12-31 NOTE — DELIVERY SUMMARY
=================================================================

Del Sum A-C

=================================================================

Datetime Report Generated by CPN: 2020 12:03

   

   

=================================================================

DELIVERY PERSONNEL

=================================================================

   

DELIVERY PERSONNEL:  [id number]

Delivery Doctor::  Lazaro Pickens, MD

Labor and Delivery Nurse::  Dana Floyd, RN

   

=================================================================

MATERNAL INFORMATION

=================================================================

   

Delivery Anesthesia:  None

Medications After Delivery:  Pitocin 10 Units IM

Meds After Delivery Comment:  Lidocaine prior to repair

Estimated  Blood Loss (ml):  250

Maternal Complications:  Precipitous Labor (<3hrs)

   

=================================================================

LABOR SUMMARY

=================================================================

   

EDC:  2021 00:00

No. Babies in Womb:  1

 Attempted:  No

Labor Anesthesia:  None

   

=================================================================

LABOR INFORMATION

=================================================================

   

Reason for Induction:  Not Applicable

Onset of Labor:  2020 06:27

Oxytocin:  N/A

Group B Beta Strep:  Negative

Antibiotics # of Doses:  0

Steroids Given:  None

Reason Steroids Not Administered:  Not Applicable

   

=================================================================

MEMBRANES

=================================================================

   

Membranes Rupture Method:  Spontaneous

Amniotic Fluid Odor:  None

   

=================================================================

STAGES OF LABOR

=================================================================

   

Stage 3 hr:  0

Stage 3 min:  27

Total Time in Labor hr:  1

Total Time in Labor min:  9

   

=================================================================

VAGINAL DELIVERY

=================================================================

   

Episiotomy:  None

Laceration #1:  Perineal

Laceration Extension #1:  Second Degree

Laceration #2:  None

Laceration Extension #2:  N/A

Laceration #3:  None

Laceration Extension #3:  N/A

Laceration Repair:  Yes

Laceration Repair Note:  Large second degree laceration repaired with

   3-0 chromic suture in usual fashion.

Sponge Count Correct:  N/A

Sharps Count Correct:  No

   

=================================================================

CSECTION DELIVERY

=================================================================

   

Primary Indication:  N/A

Secondary Indication:  N/A

CSection Incision:  N/A

   

=================================================================

BABY A INFORMATION

=================================================================

   

Infant Delivery Date/Time:  2020 07:09

Method of Delivery:  Vaginal

Nurse Controlled Delivery:  No

Nurse Controlled Delivery:  No

Born in Route :  Yes

:  N/A

:  N/A

Forceps:  N/A

Forceps:  N/A

Vacuum Extraction:  N/A

Vacuum Extraction:  N/A

Shoulder Dystocia :  No

   

=================================================================

PRESENTATION/POSITION BABY A

=================================================================

   

Presentation:  Cephalic

Cephalic Presentation:  Vertex

Breech Presentation:  N/A

   

=================================================================

PLACENTA INFORMATION BABY A

=================================================================

   

Placenta Delivery Time :  2020 07:36

Placenta Method of Delivery:  Spontaneous

Placenta Status:  Delivered

   

=================================================================

APGAR SCORES BABY A

=================================================================

   

Heart Rate 1 min:  >100 bpm

Resp Effort 1 min:  Good Cry

Reflex Irritability 1 min:  Cough or Sneeze or Pulls Away

Muscle Tone 1 min:  Active Motion

Color 1 min:  Blue/Pale

Resuscitation Effort 1 min:  Tactile Stimulation

APGAR SCORE 1 MIN:  8

Heart Rate 5 min:  >100 bpm

Resp Effort 5 min:  Good Cry

Reflex Irritability 5 min:  Cough or Sneeze or Pulls Away

Muscle Tone 5 min:  Active Motion

Color 5 min:  Body Pink, Extremities Blue

Resuscitation Effort 5 min:  Tactile Stimulation

APGAR SCORE 5 MIN:  9

   

=================================================================

INFANT INFORMATION BABY A

=================================================================

   

Gestational Age at Delivery:  39.3

Gestational Status:  Full Term- 39- 40.6 Weeks

Infant Outcome :  Liveborn

Infant Condition :  Stable

Infant Sex:  Male

Infant Sex:  Male

   

=================================================================

IDENTIFICATION BABY A

=================================================================

   

Infant Verification Date/Time:  2020 09:25

ID Band Number:  [id number]

Mother's Name Verified:  Yes

Infant Medical Record Number:  [id number]

RN Verifying Infant:  ELIANE Floyd, [facility]

Additional Verifying Personnel:  SONY Verma RN

   

=================================================================

WEIGHT/LENGTH BABY A

=================================================================

   

Infant Birthweight (gm):  3575

Infant Weight (lb):  7

Infant Weight (oz):  14

Infant Length (in):  20.50

Infant Length (cm):  52.07

   

=================================================================

CORD INFORMATION BABY A

=================================================================

   

No. Cord Vessels:  3

Nuchal Cord :  N/A

Cord Blood Taken:  Yes-For Eval (Mom's Blood Type - or O+)

Infant Suction:  None

   

=================================================================

ASSESSMENT BABY A

=================================================================

   

Infant Complications:  None

Physical Findings at Delivery:  Within Normal Limits

Transferred To:   Nursery

   

=================================================================

SIGNATURES

=================================================================

   

Signature:  Electronically signed by Lazaro Pickens MD ([facility]) on

   2020 at 08:16  with User ID: [email or web address]

## 2021-01-03 NOTE — ADMISSION PHYSICAL
=================================================================



=================================================================

Datetime Report Generated by CPN: 2021 10:43

   

   

=================================================================

CURRENT ADMISSION

=================================================================

   

Chief Complaint:  Uterine Contractions

Chief Complaint Other:  Pt delivered in the [facility].

Indication for Induction:  Not Applicable

Admit Impression :  Term, Intrauterine Pregnancy

Admit Plan:  Admit to Unit

   

=================================================================

ALLERGIES

=================================================================

   

Medication Allergies:  Yes

Medication Allergies:  progesterone/MI (2020)

Latex:  No Latex Allergies

Food Allergies:  none

Environmental Allergies:  none 

   

=================================================================

OBSTETRICAL HISTORY

=================================================================

   

EDC:  2021 00:00

:  2

Para:  1

Term:  1

:  0

SAB:  0

IAB:  0

Ectopic:  0

Livin

Cesareans:  0

VBACs:  0

Multiple Births:  0

Gestational Diabetes:  No

Rh Sensitization:  No

Incompetent Cervix:  Yes

MITCHELL:  No

Infertility:  No

ART Treatment:  No

Uterine Anomaly:  No

IUGR:  No

Hx Previous C/S:  No

Macrosomia:  No

Hx Loss/Stillborn:  No

PIH:  No

Hx  Death:  No

Placenta Previa/Abruption:  No

Depression/PP Depression:  No

PTL/PROM:  Yes

Post Partum Hemorrhage:  No

Current Pregnancy Procedures:  Ultrasound; NST

Obstetrical History Comments:  2019- Pt states she went into PTL at

   33 weeks, but delivered at 38 weeks "within 2hrs of start of labor"

   G2- Current 

   

=================================================================

***SEE PRENATAL RECORDS***

=================================================================

   

Alcohol:  No

Marijuana :  No

Cocaine:  No

Other Illicit Drugs:  No

Cigarettes:  Never Smoker. [id number]

   

=================================================================

MEDICAL HISTORY

=================================================================

   

Diabetes:  No

Blood Transfusion:  No

Pulmonary Disease (Asthma, TB):  No

Breast Disease:  No

Hypertension:  No

Gyn Surgery:  No

Heart Disease:  No

Hosp/Surgery:  Yes

Autoimmune Disorder:  No

Anesthetic Complications:  No

Kidney Disease:  No

Abnormal Pap Smear:  No

Neuro/Epilepsy:  No

Psychiatric Disorders:  No

Other Medical Diseases:  No

Hepatitis/Liver Disease:  No

Significant Family History:  No

Varicosities/Phlebitis:  No

Trauma/Violence :  No

Thyroid Dysfunction:  No

Medical History Comments:  wisdom teeth removal in , left knee

   surgery in , [facility] as a child 

   

=================================================================

INFECTIOUS HISTORY

=================================================================

   

Gonorrhea:  No

Genital Herpes:  No

Chlamydia:  No

Tuberculosis:  No

Syphilis:  No

Hepatitis:  No

HIV/AIDS Exposure:  No

Rash or Viral Illness:  No

HPV:  No

   

=================================================================

PHYSICAL EXAM

=================================================================

   

General:  Normal

HEENT:  Normal

Neurologic:  Normal

Thyroid:  Normal

Heart:  Normal

Lungs:  Normal

Breast:  Deferred

Back:  Normal

Abdomen:  Normal

Genitourinary Exam:  Normal

Extremities:  Normal

DTRs:  Normal

Pelvic Type:  Adequate

Vital Signs:  Reviewed

   

=================================================================

FETUS A

=================================================================

   

EGA:  40.1

Admit Comment:  She was admitted and the placenta was delivered.  She

   delivered the baby in the [facility].  She had a second degree perineal

   laceration that was repaired.  The delivery date and H + P are for

   30

   

=================================================================

PLANS FOR LABOR AND DELIVERY

=================================================================

   

Labor and Delivery:  None

Feeding Preference:  Breast

Benefit of Breast Feed Discussed:  Yes

Circumcision:  Yes

   

=================================================================

INFORMED CONSENT

=================================================================

   

Signature:  Electronically signed by [facility] Pickens [facility] ([facility] on

   1/3/2021 at 10:42  with User ID: [email or web address]
